# Patient Record
Sex: MALE | Race: WHITE | URBAN - METROPOLITAN AREA
[De-identification: names, ages, dates, MRNs, and addresses within clinical notes are randomized per-mention and may not be internally consistent; named-entity substitution may affect disease eponyms.]

---

## 2023-12-12 ENCOUNTER — TELEPHONE (OUTPATIENT)
Dept: FAMILY MEDICINE CLINIC | Facility: CLINIC | Age: 43
End: 2023-12-12

## 2023-12-12 NOTE — TELEPHONE ENCOUNTER
Received fax from [facility] regarding Ultrasound report  Date of service: 12/09/23  US right upper quadrant  Impression: advanced diffuse fatty infiltration of the liver    Dr. Jessica Mcdaniels reviewed - needs video visit to discuss

## 2023-12-12 NOTE — TELEPHONE ENCOUNTER
Advised patient of Dr. Carline Hassan note below. Patient verbalized understanding and reports has new PCP. No further questions at this time.

## 2023-12-13 ENCOUNTER — MED REC SCAN ONLY (OUTPATIENT)
Dept: FAMILY MEDICINE CLINIC | Facility: CLINIC | Age: 43
End: 2023-12-13

## 2023-12-18 ENCOUNTER — TELEPHONE (OUTPATIENT)
Dept: FAMILY MEDICINE CLINIC | Facility: CLINIC | Age: 43
End: 2023-12-18
